# Patient Record
Sex: FEMALE | Race: BLACK OR AFRICAN AMERICAN | NOT HISPANIC OR LATINO | Employment: OTHER | URBAN - METROPOLITAN AREA
[De-identification: names, ages, dates, MRNs, and addresses within clinical notes are randomized per-mention and may not be internally consistent; named-entity substitution may affect disease eponyms.]

---

## 2019-04-11 ENCOUNTER — TELEPHONE (OUTPATIENT)
Dept: MATERNAL FETAL MEDICINE | Facility: CLINIC | Age: 24
End: 2019-04-11

## 2019-04-11 DIAGNOSIS — R56.9 SEIZURES: Primary | ICD-10-CM

## 2019-04-11 NOTE — TELEPHONE ENCOUNTER
RN spoke with Dr Parham and relayed that the patient has stopped taking Keppra for seizures two months ago with no seizure activity since then. Dr Parham is okay with the patient continuing without the medication as long as she is seizure-free. Should she start having seizures again, he advises that she restart Keppra.  The patient agrees with this plan and verbalized understanding.    She has been scheduled for the next available [facility] appointment on 5/8/19.

## 2019-05-08 ENCOUNTER — OFFICE VISIT (OUTPATIENT)
Dept: MATERNAL FETAL MEDICINE | Facility: CLINIC | Age: 24
End: 2019-05-08
Payer: MEDICAID

## 2019-05-08 VITALS
WEIGHT: 134 LBS | SYSTOLIC BLOOD PRESSURE: 95 MMHG | HEIGHT: 68 IN | DIASTOLIC BLOOD PRESSURE: 53 MMHG | BODY MASS INDEX: 20.31 KG/M2

## 2019-05-08 DIAGNOSIS — Z36.89 ENCOUNTER FOR ULTRASOUND TO CHECK FETAL GROWTH: Primary | ICD-10-CM

## 2019-05-08 DIAGNOSIS — O99.352 SEIZURE DISORDER DURING PREGNANCY IN SECOND TRIMESTER: ICD-10-CM

## 2019-05-08 DIAGNOSIS — R56.9 SEIZURES: ICD-10-CM

## 2019-05-08 DIAGNOSIS — G40.909 SEIZURE DISORDER DURING PREGNANCY IN SECOND TRIMESTER: ICD-10-CM

## 2019-05-08 PROCEDURE — 76811 PR US, OB FETAL EVAL & EXAM, TRANSABDOM,FIRST GESTATION: ICD-10-PCS | Mod: TH,,, | Performed by: OBSTETRICS & GYNECOLOGY

## 2019-05-08 PROCEDURE — 99203 PR OFFICE/OUTPT VISIT, NEW, LEVL III, 30-44 MIN: ICD-10-PCS | Mod: TH,25,, | Performed by: OBSTETRICS & GYNECOLOGY

## 2019-05-08 PROCEDURE — 76811 OB US DETAILED SNGL FETUS: CPT | Mod: TH,,, | Performed by: OBSTETRICS & GYNECOLOGY

## 2019-05-08 PROCEDURE — 99203 OFFICE O/P NEW LOW 30 MIN: CPT | Mod: TH,25,, | Performed by: OBSTETRICS & GYNECOLOGY
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
## 2019-06-10 ENCOUNTER — PROCEDURE VISIT (OUTPATIENT)
Dept: MATERNAL FETAL MEDICINE | Facility: CLINIC | Age: 24
End: 2019-06-10
Payer: MEDICAID

## 2019-06-10 VITALS — BODY MASS INDEX: 21.13 KG/M2 | WEIGHT: 139 LBS | DIASTOLIC BLOOD PRESSURE: 60 MMHG | SYSTOLIC BLOOD PRESSURE: 110 MMHG

## 2019-06-10 DIAGNOSIS — G40.909 SEIZURE DISORDER DURING PREGNANCY IN SECOND TRIMESTER: ICD-10-CM

## 2019-06-10 DIAGNOSIS — R56.9 SEIZURES: ICD-10-CM

## 2019-06-10 DIAGNOSIS — Z36.89 ENCOUNTER FOR ULTRASOUND TO CHECK FETAL GROWTH: Primary | ICD-10-CM

## 2019-06-10 DIAGNOSIS — Z36.89 ENCOUNTER FOR ULTRASOUND TO CHECK FETAL GROWTH: ICD-10-CM

## 2019-06-10 DIAGNOSIS — O99.352 SEIZURE DISORDER DURING PREGNANCY IN SECOND TRIMESTER: ICD-10-CM

## 2019-06-10 PROCEDURE — 99499 UNLISTED E&M SERVICE: CPT | Mod: ,,, | Performed by: OBSTETRICS & GYNECOLOGY

## 2019-06-10 PROCEDURE — 99499 NO LOS: ICD-10-PCS | Mod: ,,, | Performed by: OBSTETRICS & GYNECOLOGY

## 2019-06-10 PROCEDURE — 76816 PR  US,PREGNANT UTERUS,F/U,TRANSABD APP: ICD-10-PCS | Mod: ,,, | Performed by: OBSTETRICS & GYNECOLOGY

## 2019-06-10 PROCEDURE — 76816 OB US FOLLOW-UP PER FETUS: CPT | Mod: ,,, | Performed by: OBSTETRICS & GYNECOLOGY

## 2019-07-17 ENCOUNTER — OFFICE VISIT (OUTPATIENT)
Dept: MATERNAL FETAL MEDICINE | Facility: CLINIC | Age: 24
End: 2019-07-17
Payer: MEDICAID

## 2019-07-17 VITALS — BODY MASS INDEX: 22.9 KG/M2 | DIASTOLIC BLOOD PRESSURE: 70 MMHG | WEIGHT: 150.63 LBS | SYSTOLIC BLOOD PRESSURE: 102 MMHG

## 2019-07-17 DIAGNOSIS — R56.9 SEIZURES: ICD-10-CM

## 2019-07-17 DIAGNOSIS — Z36.89 ENCOUNTER FOR ULTRASOUND TO CHECK FETAL GROWTH: ICD-10-CM

## 2019-07-17 PROCEDURE — 76816 PR  US,PREGNANT UTERUS,F/U,TRANSABD APP: ICD-10-PCS | Mod: ,,, | Performed by: OBSTETRICS & GYNECOLOGY

## 2019-07-17 PROCEDURE — 76816 OB US FOLLOW-UP PER FETUS: CPT | Mod: ,,, | Performed by: OBSTETRICS & GYNECOLOGY

## 2019-07-17 PROCEDURE — 99499 NO LOS: ICD-10-PCS | Mod: ,,, | Performed by: OBSTETRICS & GYNECOLOGY

## 2019-07-17 PROCEDURE — 99499 UNLISTED E&M SERVICE: CPT | Mod: ,,, | Performed by: OBSTETRICS & GYNECOLOGY

## 2019-07-17 NOTE — LETTER
July 17, 2019      Lacho Sullivan MD  [address]  [place] [address]           [place] [facility]  [address] , [address]  [place] [address]  Phone: [phone]          Patient: Napoleon eWst   MR Number: [id number]   YOB: 1995   Date of Visit: 7/17/2019       Dear Dr. Lacho Sullivan:    Thank you for referring Napoleon West to me for evaluation. Attached you will find relevant portions of my assessment and plan of care.    If you have questions, please do not hesitate to call me. I look forward to following Napoleon West along with you.    Sincerely,    Mark Parham MD    Enclosure  CC:  No Recipients    If you would like to receive this communication electronically, please contact [email or web address] or [phone] to request more information on [employer] access.    For providers and/or their staff who would like to refer a patient to [facility], please contact us through our one-stop-shop provider referral line, [facility] , at [phone].    If you feel you have received this communication in error or would no longer like to receive these types of communications, please e-mail [email or web address]

## 2019-08-27 ENCOUNTER — PROCEDURE VISIT (OUTPATIENT)
Dept: MATERNAL FETAL MEDICINE | Facility: CLINIC | Age: 24
End: 2019-08-27
Payer: MEDICAID

## 2019-08-27 VITALS — BODY MASS INDEX: 23.95 KG/M2 | SYSTOLIC BLOOD PRESSURE: 110 MMHG | DIASTOLIC BLOOD PRESSURE: 72 MMHG | WEIGHT: 157.5 LBS

## 2019-08-27 DIAGNOSIS — O99.352 SEIZURE DISORDER DURING PREGNANCY IN SECOND TRIMESTER: ICD-10-CM

## 2019-08-27 DIAGNOSIS — G40.909 SEIZURE DISORDER DURING PREGNANCY IN SECOND TRIMESTER: ICD-10-CM

## 2019-08-27 DIAGNOSIS — O99.353 SEIZURE DISORDER DURING PREGNANCY IN THIRD TRIMESTER: ICD-10-CM

## 2019-08-27 DIAGNOSIS — O36.5930 POOR FETAL GROWTH AFFECTING MANAGEMENT OF MOTHER IN THIRD TRIMESTER, SINGLE OR UNSPECIFIED FETUS: Primary | ICD-10-CM

## 2019-08-27 DIAGNOSIS — R56.9 SEIZURES: Primary | ICD-10-CM

## 2019-08-27 DIAGNOSIS — O36.5930 POOR FETAL GROWTH AFFECTING MANAGEMENT OF MOTHER IN THIRD TRIMESTER, SINGLE OR UNSPECIFIED FETUS: ICD-10-CM

## 2019-08-27 DIAGNOSIS — Z36.89 ENCOUNTER FOR ULTRASOUND TO CHECK FETAL GROWTH: ICD-10-CM

## 2019-08-27 DIAGNOSIS — G40.909 SEIZURE DISORDER DURING PREGNANCY IN THIRD TRIMESTER: ICD-10-CM

## 2019-08-27 PROCEDURE — 99212 OFFICE O/P EST SF 10 MIN: CPT | Mod: 25,95,TH, | Performed by: OBSTETRICS & GYNECOLOGY

## 2019-08-27 PROCEDURE — 76820 PR US, OB DOPPLER, FETAL UMBILICAL ARTERY ECHO: ICD-10-PCS | Mod: 95,,, | Performed by: OBSTETRICS & GYNECOLOGY

## 2019-08-27 PROCEDURE — 76816 OB US FOLLOW-UP PER FETUS: CPT | Mod: 95,,, | Performed by: OBSTETRICS & GYNECOLOGY

## 2019-08-27 PROCEDURE — 76816 PR  US,PREGNANT UTERUS,F/U,TRANSABD APP: ICD-10-PCS | Mod: 95,,, | Performed by: OBSTETRICS & GYNECOLOGY

## 2019-08-27 PROCEDURE — 99212 PR OFFICE/OUTPT VISIT, EST, LEVL II, 10-19 MIN: ICD-10-PCS | Mod: 25,95,TH, | Performed by: OBSTETRICS & GYNECOLOGY

## 2019-08-27 PROCEDURE — 76819 PR US, OB, FETAL BIOPHYSICAL, W/O NST: ICD-10-PCS | Mod: 95,,, | Performed by: OBSTETRICS & GYNECOLOGY

## 2019-08-27 PROCEDURE — 76819 FETAL BIOPHYS PROFIL W/O NST: CPT | Mod: 95,,, | Performed by: OBSTETRICS & GYNECOLOGY

## 2019-08-27 PROCEDURE — 76820 UMBILICAL ARTERY ECHO: CPT | Mod: 95,,, | Performed by: OBSTETRICS & GYNECOLOGY

## 2019-08-27 NOTE — PROGRESS NOTES
Telemedicine MFM Ultrasound Note      Consultation started: 2019 at 12:15 PM   The chief complaint leading to consultation is: IUGR  The patient location is: [place]  The patient arrived at: 1115 am  Spoke nurse at bedside with patient assisting consultant. Also present with the patient at the time of the consultation:family member    IUGR: The patients fetus has been diagnosed with IUGR based on an EFW less than the 10th percentile. Today on ultrasound, the EFW is 6th percentile. The amniotic fluid volume is normal. UA dopplers are normal. Limited ultrasound demonstrated no abnormalities. The patient declined genetic screening for aneuploidy. Potential etiologies of IUGR include but are not limited to normal variation of stature, placental insufficiency, chromosomal abnormalities, genetic disorders, infections, medical conditions, teratogen exposure and other etiologies. Patient is not taking anti-epileptics and reports her seizure disorder is controlled.  We discussed the increased risk of stillbirth and the potential need for earlier delivery. Due to the IUGR and increased risk of stillbirth, recommend the patient have twice weekly  fetal surveillance with weekly BPP and UA dopplers with MFM on  and NSTs with primary ob on . A follow up growth ultrasound is recommended in 3 weeks. The patient was advised to perform fetal kick counts. The patient should be monitored closely for any signs of evolving preeclampsia.  With respect to delivery planning, we recommend the following:  - 38 and 0 to 39 and 6 weeks, if EFW 5th -9th percentile, no co-morbidities exist and  surveillance is reassuring   - A gestational age of 37 weeks is reached if any of:    - EFW <5th percentile    - Umbilical artery S/D >95th percentile    -Maternal co-morbidity  Earlier delivery would be warranted if:   - testing is non-reassuring    -Oligohydramnios is diagnosed (unless GA less than 34; if  less than 34 weeks, management needs to be individualized based on other testing and entire clinical scenario)  -Preeclampsia evolves   -At 34 weeks, if there is absent diastolic flow in the umbilical artery and there has not been a previous indication for delivery    -At 32 weeks, if there is reversed diastolic flow in the umbilical artery and there has not been a previous indication for delivery     For all pregnancies with FGR, the placenta should be sent to pathology for examination.   Future Pregnancy: Low dose aspirin should be considered in subsequent pregnancies to reduce the risk of recurrent growth restriction.    Please contact M if any questions.  Please see ultrasound report from viewpoint.            Consultation ended: 8/27/2019 at 12: 25 pm.    Total time spent with patient: 10 minutes was spent in face to face time with greater than half of that time spent in counseling and coordination of care.  Consulting clinician was informed of the encounter and consult note.

## 2019-08-27 NOTE — PROGRESS NOTES
Faxed MD report to Dr. Sullivan's office at [phone] via [phone] along with coversheet stating that patient will need NSTs on Friday's at their office. PT has an appt with him tomorrow as well and instructed patient to mention needing testing.  Fax receipt confirmed at [phone].

## 2019-09-03 ENCOUNTER — PROCEDURE VISIT (OUTPATIENT)
Dept: MATERNAL FETAL MEDICINE | Facility: CLINIC | Age: 24
End: 2019-09-03
Payer: MEDICAID

## 2019-09-03 VITALS — BODY MASS INDEX: 24.5 KG/M2 | DIASTOLIC BLOOD PRESSURE: 70 MMHG | SYSTOLIC BLOOD PRESSURE: 110 MMHG | WEIGHT: 161.13 LBS

## 2019-09-03 DIAGNOSIS — Z36.89 ENCOUNTER FOR ULTRASOUND TO CHECK FETAL GROWTH: ICD-10-CM

## 2019-09-03 DIAGNOSIS — R56.9 SEIZURES: ICD-10-CM

## 2019-09-03 DIAGNOSIS — O36.5930 IUGR (INTRAUTERINE GROWTH RESTRICTION) AFFECTING CARE OF MOTHER, THIRD TRIMESTER, NOT APPLICABLE OR UNSPECIFIED FETUS: ICD-10-CM

## 2019-09-03 PROCEDURE — 76819 FETAL BIOPHYS PROFIL W/O NST: CPT | Mod: ,,, | Performed by: OBSTETRICS & GYNECOLOGY

## 2019-09-03 PROCEDURE — 76820 PR US, OB DOPPLER, FETAL UMBILICAL ARTERY ECHO: ICD-10-PCS | Mod: ,,, | Performed by: OBSTETRICS & GYNECOLOGY

## 2019-09-03 PROCEDURE — 76819 PR US, OB, FETAL BIOPHYSICAL, W/O NST: ICD-10-PCS | Mod: ,,, | Performed by: OBSTETRICS & GYNECOLOGY

## 2019-09-03 PROCEDURE — 76820 UMBILICAL ARTERY ECHO: CPT | Mod: ,,, | Performed by: OBSTETRICS & GYNECOLOGY

## 2019-09-03 NOTE — PROGRESS NOTES
Obstetrical ultrasound completed today.  See report in imaging section of [facility].

## 2019-09-10 ENCOUNTER — TELEPHONE (OUTPATIENT)
Dept: MATERNAL FETAL MEDICINE | Facility: CLINIC | Age: 24
End: 2019-09-10

## 2019-09-10 NOTE — TELEPHONE ENCOUNTER
Called Dr. Sullivan's office and spoke with Archana. Let her know that the patient cx her appt via our text system and I called but did not reach patient. She says that she is not aware that the pt. Delivered.  Will f/u prn.

## 2019-09-10 NOTE — TELEPHONE ENCOUNTER
Called to check on patient. She was scheduled for BPP and cancelled via text system but still has two other weeks of appts scheduled out. LM for patient to return call to [phone] for any needs.